# Patient Record
Sex: MALE
[De-identification: names, ages, dates, MRNs, and addresses within clinical notes are randomized per-mention and may not be internally consistent; named-entity substitution may affect disease eponyms.]

---

## 2021-10-21 ENCOUNTER — APPOINTMENT (OUTPATIENT)
Dept: ORTHOPEDIC SURGERY | Facility: CLINIC | Age: 36
End: 2021-10-21
Payer: OTHER MISCELLANEOUS

## 2021-10-21 VITALS — HEIGHT: 69 IN | BODY MASS INDEX: 32.58 KG/M2 | WEIGHT: 220 LBS

## 2021-10-21 PROBLEM — Z00.00 ENCOUNTER FOR PREVENTIVE HEALTH EXAMINATION: Status: ACTIVE | Noted: 2021-10-21

## 2021-10-21 PROCEDURE — 73562 X-RAY EXAM OF KNEE 3: CPT | Mod: LT

## 2021-10-21 PROCEDURE — 99072 ADDL SUPL MATRL&STAF TM PHE: CPT

## 2021-10-21 PROCEDURE — 99204 OFFICE O/P NEW MOD 45 MIN: CPT

## 2021-10-21 NOTE — REASON FOR VISIT
[Initial Visit] : an initial visit for [Workers' Comp: Date of Injury: _______] : This visit is related to worker's compensation. Date of Injury: [unfilled] [Knee Pain] : knee pain

## 2021-10-21 NOTE — PHYSICAL EXAM
[de-identified] : Left knee\par \par Constitutional: \par The patient is healthy-appearing and in no apparent distress. \par \par Gait:\par The patient ambulates with an antalic gait and no limp with cane assist.\par \par Cardiovascular System: \par The capillary refill is less than 2 seconds. \par \par Skin: \par There are healed longitudinal medial and lateral scars.\par There is moderate quadriceps atrophy compared to contralateral.\par There is a mild effusion.\par \par Left Knee:\par  \par Bony Palpation: \par There is tenderness of the medial joint line. \par There is tenderness of the lateral joint line.\par There is tenderness of the medial femoral chondyle.\par There is tenderness of the lateral femoral chondyle.\par There is no tenderness of the tibial tubercle.\par There is no tenderness of the superior patella.\par There is no tenderness of the inferior patella.\par There is tenderness of the medial patellar facet.\par There is tenderness of the lateral patellar facet.\par \par Soft Tissue Palpation: \par There is tenderness of the medial retinaculum.\par There is tenderness of the lateral retinaculum.\par There is no tenderness of the quadriceps tendon.\par There is no tenderness of the patella tendon.\par There is no tenderness of the ITB.\par There is no tenderness of the pes anserine.\par \par Active Range of Motion: \par The range of motion at the knee actively and passively is 0 - 70 with a hard stop and pain on flexion. \par \par Special Tests: \par There is a negative Lachman and Anterior Drawer.\par There is a negative Posterior Drawer.  \par There is no varus or valgus laxity.\par \par Strength: \par There is 4+/5 hip flexion and 5/5 knee flexion and extension.  \par \par Psychiatric: \par The patient demonstrates a normal mood and affect and is active and alert [de-identified] : Given patient's reported history and physical examination, x-ray evaluation ( as listed below ) was ordered and performed to aid in diagnosis and treatment of the patient.\par X-ray left knee.  Status post bicondylar tibial plateau ORIF.  There is mild patellofemoral arthritis with mild lateral patellar tilt.  There is slight elevation of the tibial spine.  There is marked heterotopic ossification overlying the MCL

## 2021-10-21 NOTE — HISTORY OF PRESENT ILLNESS
[de-identified] : Location: Left knee\par Duration: 2/29/20\par Context: was at work and fell off a ladder\par Quality: sharp, instability\par Aggravating factors: being on his feet for a long time, walking, cold weather\par Conservative treatment: cane, heat, ice\par Prior studies: N/A\par ORIF bicondylar tibial plateau 3/6/20 at [facility] - here for a second opinion today\par Patient states he has not been working and has had persistent pain since his injury and bicondylar open reduction internal fixation of his plateau.  He states that physical therapy is no longer covered and he has persistent knee stiffness with pain diffusely around the knee.  History and examination performed with [place] translation via language line.\par

## 2021-10-21 NOTE — ASSESSMENT
[FreeTextEntry1] : Discussed at length with patient exam history and imaging and splint 30 minutes with the patient reviewing his prior treatment current examination and treatment options.  I would at this time recommend formal MRI evaluation given the stiffness in his knee and discussed some of his discomfort may be one inherently to be marked stiffness of the knee as well as persistent quad atrophy.   Recommend MRI evaluation as patient may benefit from arthroscopic lyses of adhesions with manipulation under anesthesia to help decrease some of his pain.\par

## 2021-10-24 ENCOUNTER — FORM ENCOUNTER (OUTPATIENT)
Age: 36
End: 2021-10-24

## 2022-05-25 ENCOUNTER — APPOINTMENT (OUTPATIENT)
Dept: ORTHOPEDIC SURGERY | Facility: CLINIC | Age: 37
End: 2022-05-25

## 2022-06-01 ENCOUNTER — APPOINTMENT (OUTPATIENT)
Dept: ORTHOPEDIC SURGERY | Facility: CLINIC | Age: 37
End: 2022-06-01

## 2022-06-02 ENCOUNTER — APPOINTMENT (OUTPATIENT)
Dept: ORTHOPEDIC SURGERY | Facility: CLINIC | Age: 37
End: 2022-06-02

## 2022-06-28 ENCOUNTER — FORM ENCOUNTER (OUTPATIENT)
Age: 37
End: 2022-06-28

## 2022-06-28 ENCOUNTER — APPOINTMENT (OUTPATIENT)
Dept: ORTHOPEDIC SURGERY | Facility: CLINIC | Age: 37
End: 2022-06-28
Payer: OTHER MISCELLANEOUS

## 2022-06-28 PROCEDURE — 99213 OFFICE O/P EST LOW 20 MIN: CPT

## 2022-06-28 PROCEDURE — 99072 ADDL SUPL MATRL&STAF TM PHE: CPT

## 2022-06-28 RX ORDER — TRAMADOL HYDROCHLORIDE 50 MG/1
50 TABLET, COATED ORAL
Qty: 30 | Refills: 0 | Status: ACTIVE | COMMUNITY
Start: 2022-06-28 | End: 1900-01-01

## 2022-06-28 NOTE — HISTORY OF PRESENT ILLNESS
[de-identified] : Patient is an established paitent last seen for LEFT knee pain and stiffness on October 2021.  He was recommended for MRI evalaution and states work comp did not approve.  States persistent pain and stiffness.  States minimal relief with OTC advil and tylenol.  History and evaluation with Language Line for [facility].

## 2022-06-28 NOTE — PHYSICAL EXAM
[de-identified] : Left knee\par \par Constitutional: \par The patient is healthy-appearing and in no apparent distress. \par \par Gait:\par The patient ambulates with an antalgic gait and and limp.\par \par Cardiovascular System: \par The capillary refill is less than 2 seconds. \par \par Skin: \par There are healed longitudinal medial and lateral scars.\par There is mild quadriceps atrophy compared to contralateral.\par There is a mild effusion.\par \par Left Knee:\par  \par Bony Palpation: \par There is tenderness of the medial joint line. \par There is tenderness of the lateral joint line.\par There is tenderness of the medial femoral chondyle.\par There is tenderness of the lateral femoral chondyle.\par There is no tenderness of the tibial tubercle.\par There is no tenderness of the superior patella.\par There is no tenderness of the inferior patella.\par There is tenderness of the medial patellar facet.\par There is tenderness of the lateral patellar facet.\par \par Soft Tissue Palpation: \par There is tenderness of the medial retinaculum.\par There is tenderness of the lateral retinaculum.\par There is no tenderness of the quadriceps tendon.\par There is no tenderness of the patella tendon.\par There is no tenderness of the ITB.\par There is no tenderness of the pes anserine.\par \par Active Range of Motion: \par The range of motion at the knee actively and passively is 0 - 70 with a hard stop and pain on flexion. \par \par Special Tests: \par There is a negative Lachman and Anterior Drawer.\par There is a negative Posterior Drawer.  \par There is no varus or valgus laxity.\par \par Strength: \par There is 4+/5 hip flexion and 5/5 knee flexion and extension.  \par \par Psychiatric: \par The patient demonstrates a normal mood and affect and is active and alert

## 2022-06-28 NOTE — ASSESSMENT
[FreeTextEntry1] : Re-discussed at length again persistent pain and stiffness and re-recommendation for MRI evaluation.  Discussed concern over lack of prior approval and persistent knee stiffness. Patient requires MRI evaluation of his knee to help determine treatment options to help maximize his medical improvement.  Failure to approve MRI may hinder the patients ability to improve his condition.

## 2023-05-17 ENCOUNTER — APPOINTMENT (OUTPATIENT)
Dept: ORTHOPEDIC SURGERY | Facility: CLINIC | Age: 38
End: 2023-05-17
Payer: OTHER MISCELLANEOUS

## 2023-05-17 DIAGNOSIS — M25.562 PAIN IN LEFT KNEE: ICD-10-CM

## 2023-05-17 DIAGNOSIS — M25.669 STIFFNESS OF UNSPECIFIED KNEE, NOT ELSEWHERE CLASSIFIED: ICD-10-CM

## 2023-05-17 PROCEDURE — 99213 OFFICE O/P EST LOW 20 MIN: CPT

## 2023-05-18 NOTE — HISTORY OF PRESENT ILLNESS
[de-identified] : Patient is an established patient presenting for re-evaluation of LEFT knee pain.  States MRI was not approved.  He states persistent knee pain.  .  States CT scan of knee obtained- shows lateral compartment arthritis and multiple osseous fragments in notch.

## 2023-05-18 NOTE — PHYSICAL EXAM
[de-identified] : Left knee\par \par Constitutional: \par The patient is healthy-appearing and in no apparent distress. \par \par Gait:\par The patient ambulates with an antalgic gait and and limp.\par \par Cardiovascular System: \par The capillary refill is less than 2 seconds. \par \par Skin: \par There are healed longitudinal medial and lateral scars.\par There is mild quadriceps atrophy compared to contralateral.\par There is a mild effusion.\par \par Left Knee:\par  \par Bony Palpation: \par There is tenderness of the medial joint line. \par There is tenderness of the lateral joint line.\par There is tenderness of the medial femoral chondyle.\par There is tenderness of the lateral femoral chondyle.\par There is no tenderness of the tibial tubercle.\par There is no tenderness of the superior patella.\par There is no tenderness of the inferior patella.\par There is tenderness of the medial patellar facet.\par There is tenderness of the lateral patellar facet.\par \par Soft Tissue Palpation: \par There is tenderness of the medial retinaculum.\par There is tenderness of the lateral retinaculum.\par There is no tenderness of the quadriceps tendon.\par There is no tenderness of the patella tendon.\par There is no tenderness of the ITB.\par There is no tenderness of the pes anserine.\par \par Active Range of Motion: \par The range of motion at the knee actively and passively is 0 - 70 with a hard stop and pain on flexion. \par \par Special Tests: \par There is a negative Lachman and Anterior Drawer.\par There is a negative Posterior Drawer.  \par There is no varus or valgus laxity.\par \par Strength: \par There is 4+/5 hip flexion and 5/5 knee flexion and extension.  \par \par Psychiatric: \par The patient demonstrates a normal mood and affect and is active and alert [de-identified] : CT scan as above

## 2024-03-25 ENCOUNTER — APPOINTMENT (OUTPATIENT)
Dept: ORTHOPEDIC SURGERY | Facility: CLINIC | Age: 39
End: 2024-03-25
Payer: OTHER MISCELLANEOUS

## 2024-03-25 VITALS — WEIGHT: 220 LBS | HEIGHT: 69 IN | BODY MASS INDEX: 32.58 KG/M2

## 2024-03-25 DIAGNOSIS — M23.90 UNSPECIFIED INTERNAL DERANGEMENT OF UNSPECIFIED KNEE: ICD-10-CM

## 2024-03-25 PROCEDURE — 99213 OFFICE O/P EST LOW 20 MIN: CPT

## 2024-03-25 NOTE — HISTORY OF PRESENT ILLNESS
[de-identified] : Patient is an established patient last seen 10 months ago with persistent left knee pain he had a CT scan which revealed multiple multiple loose bodies and is following up with persistent discomfort

## 2024-03-25 NOTE — ASSESSMENT
[FreeTextEntry1] : Reviewed at length again with the patient via  recommendation for MRI given the multiple loose bodies for further evaluation and surgical consideration

## 2024-03-25 NOTE — PHYSICAL EXAM
[de-identified] : Left knee\par  \par  Constitutional: \par  The patient is healthy-appearing and in no apparent distress. \par  \par  Gait:\par  The patient ambulates with an antalgic gait and and limp.\par  \par  Cardiovascular System: \par  The capillary refill is less than 2 seconds. \par  \par  Skin: \par  There are healed longitudinal medial and lateral scars.\par  There is mild quadriceps atrophy compared to contralateral.\par  There is a mild effusion.\par  \par  Left Knee:\par   \par  Bony Palpation: \par  There is tenderness of the medial joint line. \par  There is tenderness of the lateral joint line.\par  There is tenderness of the medial femoral chondyle.\par  There is tenderness of the lateral femoral chondyle.\par  There is no tenderness of the tibial tubercle.\par  There is no tenderness of the superior patella.\par  There is no tenderness of the inferior patella.\par  There is tenderness of the medial patellar facet.\par  There is tenderness of the lateral patellar facet.\par  \par  Soft Tissue Palpation: \par  There is tenderness of the medial retinaculum.\par  There is tenderness of the lateral retinaculum.\par  There is no tenderness of the quadriceps tendon.\par  There is no tenderness of the patella tendon.\par  There is no tenderness of the ITB.\par  There is no tenderness of the pes anserine.\par  \par  Active Range of Motion: \par  The range of motion at the knee actively and passively is 0 - 70 with a hard stop and pain on flexion. \par  \par  Special Tests: \par  There is a negative Lachman and Anterior Drawer.\par  There is a negative Posterior Drawer.  \par  There is no varus or valgus laxity.\par  \par  Strength: \par  There is 4+/5 hip flexion and 5/5 knee flexion and extension.  \par  \par  Psychiatric: \par  The patient demonstrates a normal mood and affect and is active and alert

## 2024-06-27 ENCOUNTER — APPOINTMENT (OUTPATIENT)
Dept: ORTHOPEDIC SURGERY | Facility: CLINIC | Age: 39
End: 2024-06-27

## 2024-08-12 ENCOUNTER — APPOINTMENT (OUTPATIENT)
Dept: ORTHOPEDIC SURGERY | Facility: CLINIC | Age: 39
End: 2024-08-12

## 2024-08-14 ENCOUNTER — APPOINTMENT (OUTPATIENT)
Dept: ORTHOPEDIC SURGERY | Facility: CLINIC | Age: 39
End: 2024-08-14
Payer: OTHER MISCELLANEOUS

## 2024-08-14 DIAGNOSIS — M17.12 UNILATERAL PRIMARY OSTEOARTHRITIS, LEFT KNEE: ICD-10-CM

## 2024-08-14 PROCEDURE — 99213 OFFICE O/P EST LOW 20 MIN: CPT | Mod: 25

## 2024-08-14 RX ORDER — NABUMETONE 500 MG/1
500 TABLET, FILM COATED ORAL
Qty: 60 | Refills: 2 | Status: ACTIVE | COMMUNITY
Start: 2024-08-14 | End: 1900-01-01

## 2024-08-15 NOTE — HISTORY OF PRESENT ILLNESS
[de-identified] : Patient is an established patient presenting for follow-up evaluation regards to his left knee he states persistent stiffness and pain history and exam performed with language line translation for [place]

## 2024-08-15 NOTE — PROCEDURE

## 2024-08-15 NOTE — PHYSICAL EXAM
[de-identified] : Left knee\par  \par  Constitutional: \par  The patient is healthy-appearing and in no apparent distress. \par  \par  Gait:\par  The patient ambulates with an antalgic gait and and limp.\par  \par  Cardiovascular System: \par  The capillary refill is less than 2 seconds. \par  \par  Skin: \par  There are healed longitudinal medial and lateral scars.\par  There is mild quadriceps atrophy compared to contralateral.\par  There is a mild effusion.\par  \par  Left Knee:\par   \par  Bony Palpation: \par  There is tenderness of the medial joint line. \par  There is tenderness of the lateral joint line.\par  There is tenderness of the medial femoral chondyle.\par  There is tenderness of the lateral femoral chondyle.\par  There is no tenderness of the tibial tubercle.\par  There is no tenderness of the superior patella.\par  There is no tenderness of the inferior patella.\par  There is tenderness of the medial patellar facet.\par  There is tenderness of the lateral patellar facet.\par  \par  Soft Tissue Palpation: \par  There is tenderness of the medial retinaculum.\par  There is tenderness of the lateral retinaculum.\par  There is no tenderness of the quadriceps tendon.\par  There is no tenderness of the patella tendon.\par  There is no tenderness of the ITB.\par  There is no tenderness of the pes anserine.\par  \par  Active Range of Motion: \par  The range of motion at the knee actively and passively is 0 - 70 with a hard stop and pain on flexion. \par  \par  Special Tests: \par  There is a negative Lachman and Anterior Drawer.\par  There is a negative Posterior Drawer.  \par  There is no varus or valgus laxity.\par  \par  Strength: \par  There is 4+/5 hip flexion and 5/5 knee flexion and extension.  \par  \par  Psychiatric: \par  The patient demonstrates a normal mood and affect and is active and alert [de-identified] : MRI left knee: There is moderate to severe tricompartmental arthritis with mild loose cartilaginous bodies.  Status post tibial plateau ORIF with apparent healing

## 2024-08-15 NOTE — PROCEDURE

## 2024-08-15 NOTE — HISTORY OF PRESENT ILLNESS
[de-identified] : Patient is an established patient presenting for follow-up evaluation regards to his left knee he states persistent stiffness and pain history and exam performed with language line translation for [place]

## 2024-08-15 NOTE — ASSESSMENT
[FreeTextEntry1] : Discussed at length with patient severity of knee stiffness and arthritis as well as treatment options and at this time patient elects cortisone injection physical therapy with the understanding that ultimately the only way to's necessarily improve his range of motion and pain would be a total knee replacement

## 2024-08-15 NOTE — PHYSICAL EXAM
[de-identified] : Left knee\par  \par  Constitutional: \par  The patient is healthy-appearing and in no apparent distress. \par  \par  Gait:\par  The patient ambulates with an antalgic gait and and limp.\par  \par  Cardiovascular System: \par  The capillary refill is less than 2 seconds. \par  \par  Skin: \par  There are healed longitudinal medial and lateral scars.\par  There is mild quadriceps atrophy compared to contralateral.\par  There is a mild effusion.\par  \par  Left Knee:\par   \par  Bony Palpation: \par  There is tenderness of the medial joint line. \par  There is tenderness of the lateral joint line.\par  There is tenderness of the medial femoral chondyle.\par  There is tenderness of the lateral femoral chondyle.\par  There is no tenderness of the tibial tubercle.\par  There is no tenderness of the superior patella.\par  There is no tenderness of the inferior patella.\par  There is tenderness of the medial patellar facet.\par  There is tenderness of the lateral patellar facet.\par  \par  Soft Tissue Palpation: \par  There is tenderness of the medial retinaculum.\par  There is tenderness of the lateral retinaculum.\par  There is no tenderness of the quadriceps tendon.\par  There is no tenderness of the patella tendon.\par  There is no tenderness of the ITB.\par  There is no tenderness of the pes anserine.\par  \par  Active Range of Motion: \par  The range of motion at the knee actively and passively is 0 - 70 with a hard stop and pain on flexion. \par  \par  Special Tests: \par  There is a negative Lachman and Anterior Drawer.\par  There is a negative Posterior Drawer.  \par  There is no varus or valgus laxity.\par  \par  Strength: \par  There is 4+/5 hip flexion and 5/5 knee flexion and extension.  \par  \par  Psychiatric: \par  The patient demonstrates a normal mood and affect and is active and alert [de-identified] : MRI left knee: There is moderate to severe tricompartmental arthritis with mild loose cartilaginous bodies.  Status post tibial plateau ORIF with apparent healing